# Patient Record
Sex: MALE | Race: WHITE | Employment: UNEMPLOYED
[De-identification: names, ages, dates, MRNs, and addresses within clinical notes are randomized per-mention and may not be internally consistent; named-entity substitution may affect disease eponyms.]

---

## 2020-07-05 PROBLEM — M25.511 RIGHT SHOULDER PAIN: Status: ACTIVE | Noted: 2020-07-05

## 2020-09-07 PROBLEM — I16.1 HYPERTENSIVE EMERGENCY: Status: ACTIVE | Noted: 2020-09-07

## 2020-09-07 PROBLEM — F10.10 ALCOHOL ABUSE: Status: ACTIVE | Noted: 2020-09-07

## 2020-09-07 PROBLEM — I21.4 NSTEMI (NON-ST ELEVATED MYOCARDIAL INFARCTION) (HCC): Status: ACTIVE | Noted: 2020-09-07

## 2021-03-26 PROBLEM — R07.9 CHEST PAIN: Status: ACTIVE | Noted: 2021-03-26

## 2021-03-26 PROBLEM — F10.10 ETOH ABUSE: Status: ACTIVE | Noted: 2021-03-26

## 2021-04-27 PROBLEM — E87.1 HYPONATREMIA: Status: ACTIVE | Noted: 2021-04-27

## 2021-04-27 PROBLEM — S32.009A LUMBAR FRACTURE WITH CORD INJURY (HCC): Status: ACTIVE | Noted: 2021-04-27

## 2021-04-27 PROBLEM — S34.109A LUMBAR FRACTURE WITH CORD INJURY (HCC): Status: ACTIVE | Noted: 2021-04-27

## 2021-04-27 PROBLEM — S32.019A L1 VERTEBRAL FRACTURE (HCC): Status: ACTIVE | Noted: 2021-04-27

## 2021-04-27 PROBLEM — N17.9 ACUTE RENAL FAILURE (ARF) (HCC): Status: ACTIVE | Noted: 2021-04-27

## 2021-04-27 PROBLEM — F10.20 ALCOHOL DEPENDENCE (HCC): Status: ACTIVE | Noted: 2021-04-27

## 2021-04-27 PROBLEM — E86.0 DEHYDRATION: Status: ACTIVE | Noted: 2021-04-27

## 2021-12-02 ENCOUNTER — OFFICE VISIT (OUTPATIENT)
Dept: FAMILY MEDICINE CLINIC | Facility: CLINIC | Age: 56
End: 2021-12-02

## 2021-12-02 VITALS
SYSTOLIC BLOOD PRESSURE: 170 MMHG | DIASTOLIC BLOOD PRESSURE: 100 MMHG | WEIGHT: 282 LBS | BODY MASS INDEX: 34.34 KG/M2 | OXYGEN SATURATION: 99 % | HEART RATE: 77 BPM | HEIGHT: 76 IN | TEMPERATURE: 98 F | RESPIRATION RATE: 18 BRPM

## 2021-12-02 DIAGNOSIS — F10.10 ALCOHOL ABUSE: ICD-10-CM

## 2021-12-02 DIAGNOSIS — I25.10 CORONARY ARTERY DISEASE INVOLVING NATIVE HEART WITHOUT ANGINA PECTORIS, UNSPECIFIED VESSEL OR LESION TYPE: ICD-10-CM

## 2021-12-02 DIAGNOSIS — F32.A DEPRESSION, UNSPECIFIED DEPRESSION TYPE: ICD-10-CM

## 2021-12-02 DIAGNOSIS — M10.9 GOUT, UNSPECIFIED CAUSE, UNSPECIFIED CHRONICITY, UNSPECIFIED SITE: ICD-10-CM

## 2021-12-02 DIAGNOSIS — M79.89 LEG SWELLING: Primary | ICD-10-CM

## 2021-12-02 DIAGNOSIS — I10 PRIMARY HYPERTENSION: ICD-10-CM

## 2021-12-02 PROCEDURE — 99203 OFFICE O/P NEW LOW 30 MIN: CPT | Performed by: FAMILY MEDICINE

## 2021-12-02 RX ORDER — ROSUVASTATIN CALCIUM 40 MG/1
TABLET, COATED ORAL
Qty: 30 TABLET | Refills: 2
Start: 2021-12-02 | End: 2022-02-14 | Stop reason: SDUPTHER

## 2021-12-02 RX ORDER — CARVEDILOL 25 MG/1
TABLET ORAL
Qty: 60 TABLET | Refills: 2
Start: 2021-12-02 | End: 2022-02-14 | Stop reason: SDUPTHER

## 2021-12-02 RX ORDER — LISINOPRIL AND HYDROCHLOROTHIAZIDE 12.5; 2 MG/1; MG/1
1 TABLET ORAL DAILY
Qty: 30 TABLET | Refills: 2 | Status: SHIPPED | OUTPATIENT
Start: 2021-12-02 | End: 2022-03-21 | Stop reason: SDUPTHER

## 2021-12-02 RX ORDER — FLUOXETINE HYDROCHLORIDE 40 MG/1
40 CAPSULE ORAL DAILY
Qty: 30 CAPSULE | Refills: 2
Start: 2021-12-02 | End: 2022-03-21 | Stop reason: SDUPTHER

## 2021-12-02 RX ORDER — ALLOPURINOL 300 MG/1
300 TABLET ORAL DAILY
Qty: 30 TABLET | Refills: 2
Start: 2021-12-02 | End: 2022-02-14 | Stop reason: SDUPTHER

## 2021-12-02 NOTE — PROGRESS NOTES
[place] financial assistance application given to the patient    Release of information signed by patient to get records from cardiologist Dr Jose Roldan    Discharge instructions reviewed with patient    Medication list and understanding of medications reviewed with patient. OTC and herbal medications reviewed and added to med list if applicable  Barriers to adherence assessed. Guidance given regarding new medications this visit, including reason for taking this medicine, and common side effects. AVS given to patient. Explained to patient. Patient expressed understanding.

## 2021-12-02 NOTE — PROGRESS NOTES
ELVIS Lyons is a 64 y.o. male being seen today for   Chief Complaint   Patient presents with    Medication Refill     Cholestrol, Htn, Gout medication. Pt has not taken medication in a while. .IOV for this pt to care a Hiram Elizalde.    he states that he needs medicine refilled for blood pressure and gout and cholesterol. He was most recently on prozac, allopurinol, lisinopril, crestor. He is out of all his meds. His previous physician retired. pmhx includes CAD, MI, COPD, anxiety, depression. MI was over a year ago. He states he does not have chest pain,  He does have some leg swelling. Last echo was about a year ago (result not in chart)  he was seeing Dr Jose Dhillon for cardiology. He does not think he has CHF. He can lie flat to sleep without difficulty. Does not smoke. Drinks 4-5 beers most days but has not had a drink since he checked in a [employer] misison about a week ago and he states no issues with not drinking. He has a daily tremor for almost a year. Leg swelling for a year or so. Does not know if he has any liver disease       Past Medical History:   Diagnosis Date    Depression     Emphysema lung ([facility] [address]     Gout     Hypertension     Ill-defined condition     upper and lower back pain         ROS  Patient states that he is feeling well. Denies complaints of chest pain, shortness of breath, dizziness or weakness. he denies nausea, vomiting or diarrhea. Current Outpatient Medications   Medication Sig    FLUoxetine (PROzac) 40 mg capsule Take 1 Capsule by mouth daily.  allopurinoL (ZYLOPRIM) 300 mg tablet Take 1 Tablet by mouth daily.  rosuvastatin (CRESTOR) 40 mg tablet rosuvastatin 40 mg tablet   Take 1 tablet every day by oral route.  lisinopril-hydroCHLOROthiazide (PRINZIDE, ZESTORETIC) 20-12.5 mg per tablet Take 1 Tablet by mouth daily.  carvediloL (COREG) 25 mg tablet carvedilol 25 mg tablet   Take 1 tablet twice a day by oral route.     isosorbide mononitrate ER (IMDUR) 30 mg tablet Take 1 Tab by mouth daily. Indications: prevention of anginal chest pain associated with coronary artery disease (Patient not taking: Reported on 12/2/2021)    spironolactone (ALDACTONE) 25 mg tablet Take 25 mg by mouth daily. (Patient not taking: Reported on 12/2/2021)    aclidinium bromide Serge Ivans Pressair) 400 mcg/actuation inhaler Tudorza Pressair 400 mcg/actuation breath activated   Inhale 1 puff(s) EVERY 12 HOURS by inhalation route. --he is given 2 inhaler samples w 30 puffs each (Patient not taking: Reported on 12/2/2021)     No current facility-administered medications for this visit. PE  Visit Vitals  BP (!) 170/100 (BP 1 Location: Left arm, BP Patient Position: Sitting, BP Cuff Size: Large adult long)   Pulse 77   Temp 98 °F (36.7 °C) (Temporal)   Resp 18   Ht 6' 4\" (1.93 m)   Wt 282 lb (127.9 kg)   SpO2 99%   BMI 34.33 kg/m²        Alert and oriented with normal mood and affect. he is well developed and well nourished . Lungs are clear without wheezing. Heart rate is regular without murmurs or gallops. There is 2+ lower extremity edema. Assessment and Plan:        ICD-10-CM ICD-9-CM    1. Leg swelling   May be multifactorial.  Suspect liver disease based on labs (low platelets, elevated LFT)  M79.89 729.81    2. Primary hypertension   Restart lisinopril at higher dose and add hctz for additional control I10 401.9    3. Gout, unspecified cause, unspecified chronicity, unspecified site   Refill allopurinol M10.9 274.9    4. Coronary artery disease involving native heart without angina pectoris, unspecified vessel or lesion type  I25.10 414.01    5. Depression, unspecified depression type   Restart prozac F32. A 311    6. Alcohol abuse   Chart dx of alcohol abuse. Per pt he is having no problem with not drinking at the moment. Encouraged him to continue abstinence. Will discuss more on his follow up F10.10 305.00      followup in one month for recheck and labs. Check cbc, cmp, INR, hep panel    Awilda Lamar MD

## 2022-01-06 ENCOUNTER — OFFICE VISIT (OUTPATIENT)
Dept: FAMILY MEDICINE CLINIC | Facility: CLINIC | Age: 57
End: 2022-01-06

## 2022-01-06 ENCOUNTER — HOSPITAL ENCOUNTER (OUTPATIENT)
Dept: LAB | Age: 57
Discharge: HOME | End: 2022-01-06

## 2022-01-06 VITALS
RESPIRATION RATE: 16 BRPM | BODY MASS INDEX: 34.33 KG/M2 | SYSTOLIC BLOOD PRESSURE: 120 MMHG | HEART RATE: 73 BPM | HEIGHT: 76 IN | OXYGEN SATURATION: 99 % | TEMPERATURE: 98 F | DIASTOLIC BLOOD PRESSURE: 73 MMHG

## 2022-01-06 DIAGNOSIS — F41.9 ANXIETY: ICD-10-CM

## 2022-01-06 DIAGNOSIS — D69.6 THROMBOCYTOPENIA (HCC): ICD-10-CM

## 2022-01-06 DIAGNOSIS — M79.89 LEG SWELLING: Primary | ICD-10-CM

## 2022-01-06 DIAGNOSIS — M79.89 LEG SWELLING: ICD-10-CM

## 2022-01-06 LAB
ALBUMIN SERPL-MCNC: 4 G/DL (ref 3.4–5)
ALBUMIN/GLOB SERPL: 1.3 {RATIO} (ref 0.8–1.7)
ALP SERPL-CCNC: 71 U/L (ref 45–117)
ALT SERPL-CCNC: 38 U/L (ref 16–61)
ANION GAP SERPL CALC-SCNC: 7 MMOL/L (ref 3–18)
AST SERPL-CCNC: 31 U/L (ref 10–38)
BASOPHILS # BLD: 0 K/UL (ref 0–0.1)
BASOPHILS NFR BLD: 0 % (ref 0–2)
BILIRUB SERPL-MCNC: 0.4 MG/DL (ref 0.2–1)
BUN SERPL-MCNC: 17 MG/DL (ref 7–18)
BUN/CREAT SERPL: 18 (ref 12–20)
CALCIUM SERPL-MCNC: 9 MG/DL (ref 8.5–10.1)
CHLORIDE SERPL-SCNC: 102 MMOL/L (ref 100–111)
CO2 SERPL-SCNC: 26 MMOL/L (ref 21–32)
CREAT SERPL-MCNC: 0.96 MG/DL (ref 0.6–1.3)
DIFFERENTIAL METHOD BLD: ABNORMAL
EOSINOPHIL # BLD: 0 K/UL (ref 0–0.4)
EOSINOPHIL NFR BLD: 1 % (ref 0–5)
ERYTHROCYTE [DISTWIDTH] IN BLOOD BY AUTOMATED COUNT: 14.9 % (ref 11.6–14.5)
GLOBULIN SER CALC-MCNC: 3.2 G/DL (ref 2–4)
GLUCOSE SERPL-MCNC: 103 MG/DL (ref 74–99)
HCT VFR BLD AUTO: 32.7 % (ref 36–48)
HGB BLD-MCNC: 10.1 G/DL (ref 13–16)
IMM GRANULOCYTES # BLD AUTO: 0 K/UL (ref 0–0.04)
IMM GRANULOCYTES NFR BLD AUTO: 0 % (ref 0–0.5)
INR PPP: 1 (ref 0.8–1.2)
LYMPHOCYTES # BLD: 0.5 K/UL (ref 0.9–3.6)
LYMPHOCYTES NFR BLD: 18 % (ref 21–52)
MCH RBC QN AUTO: 29.2 PG (ref 24–34)
MCHC RBC AUTO-ENTMCNC: 30.9 G/DL (ref 31–37)
MCV RBC AUTO: 94.5 FL (ref 78–100)
MONOCYTES # BLD: 0.7 K/UL (ref 0.05–1.2)
MONOCYTES NFR BLD: 25 % (ref 3–10)
NEUTS SEG # BLD: 1.6 K/UL (ref 1.8–8)
NEUTS SEG NFR BLD: 56 % (ref 40–73)
NRBC # BLD: 0 K/UL (ref 0–0.01)
NRBC BLD-RTO: 0 PER 100 WBC
PLATELET # BLD AUTO: 170 K/UL (ref 135–420)
PMV BLD AUTO: 10.3 FL (ref 9.2–11.8)
POTASSIUM SERPL-SCNC: 4.5 MMOL/L (ref 3.5–5.5)
PROT SERPL-MCNC: 7.2 G/DL (ref 6.4–8.2)
PROTHROMBIN TIME: 13.4 SEC (ref 11.5–15.2)
RBC # BLD AUTO: 3.46 M/UL (ref 4.35–5.65)
SODIUM SERPL-SCNC: 135 MMOL/L (ref 136–145)
WBC # BLD AUTO: 2.9 K/UL (ref 4.6–13.2)

## 2022-01-06 PROCEDURE — 80074 ACUTE HEPATITIS PANEL: CPT

## 2022-01-06 PROCEDURE — 85610 PROTHROMBIN TIME: CPT

## 2022-01-06 PROCEDURE — 99214 OFFICE O/P EST MOD 30 MIN: CPT | Performed by: FAMILY MEDICINE

## 2022-01-06 PROCEDURE — 80053 COMPREHEN METABOLIC PANEL: CPT

## 2022-01-06 PROCEDURE — 85025 COMPLETE CBC W/AUTO DIFF WBC: CPT

## 2022-01-06 RX ORDER — BUSPIRONE HYDROCHLORIDE 5 MG/1
5 TABLET ORAL 2 TIMES DAILY
Qty: 60 TABLET | Refills: 2
Start: 2022-01-06 | End: 2022-03-21 | Stop reason: SDUPTHER

## 2022-01-06 RX ORDER — LORAZEPAM 1 MG/1
1 TABLET ORAL
Qty: 30 TABLET | Refills: 0 | Status: SHIPPED | OUTPATIENT
Start: 2022-01-06

## 2022-01-06 RX ORDER — ARIPIPRAZOLE 5 MG/1
5 TABLET ORAL DAILY
Qty: 30 TABLET | Refills: 2
Start: 2022-01-06 | End: 2022-03-21 | Stop reason: SDUPTHER

## 2022-01-06 NOTE — PROGRESS NOTES
ELVIS Caicedo is a 64 y.o. male being seen today for   Chief Complaint   Patient presents with    Medication Refill   . he states that follow up for this pt seen a month ago with elevated bp, depression, possible cirrhosis. Started back on meds for depression, gout, htn. States he was on lorazapam bid in the past.  Dr Milly Ogden was prescribring this for him. He was taking for anxiiety, not for alcohol withdrawal but he did recently stop drinking about 5 weeks ago. He had some milld shakes when he stopped. He thinks his alcohol has been a form of self medication in the past.  He is not having any trouble with being off alcohol so far    Has a chart dx of copd but inhalers never really seemed to help. He is a non smoker. His leg swelling is a little better         Past Medical History:   Diagnosis Date    Depression     Emphysema lung ([address]     Gout     Hypertension     Ill-defined condition     upper and lower back pain         ROS  Patient states that he is feeling well. Denies complaints of chest pain, shortness of breath, dizziness or weakness. he denies nausea, vomiting or diarrhea. Current Outpatient Medications   Medication Sig    FLUoxetine (PROzac) 40 mg capsule Take 1 Capsule by mouth daily.  allopurinoL (ZYLOPRIM) 300 mg tablet Take 1 Tablet by mouth daily.  rosuvastatin (CRESTOR) 40 mg tablet rosuvastatin 40 mg tablet   Take 1 tablet every day by oral route.  lisinopril-hydroCHLOROthiazide (PRINZIDE, ZESTORETIC) 20-12.5 mg per tablet Take 1 Tablet by mouth daily.  carvediloL (COREG) 25 mg tablet carvedilol 25 mg tablet   Take 1 tablet twice a day by oral route.  isosorbide mononitrate ER (IMDUR) 30 mg tablet Take 1 Tab by mouth daily. Indications: prevention of anginal chest pain associated with coronary artery disease (Patient not taking: Reported on 12/2/2021)    spironolactone (ALDACTONE) 25 mg tablet Take 25 mg by mouth daily.  (Patient not taking: Reported on 12/2/2021)    aclidinium bromide Virgilio Delgado Pressair) 400 mcg/actuation inhaler Tudorza Pressair 400 mcg/actuation breath activated   Inhale 1 puff(s) EVERY 12 HOURS by inhalation route. --he is given 2 inhaler samples w 30 puffs each (Patient not taking: Reported on 12/2/2021)     No current facility-administered medications for this visit. PE  Visit Vitals  /73 (BP 1 Location: Left upper arm, BP Patient Position: Sitting, BP Cuff Size: Adult)   Pulse 73   Temp 98 °F (36.7 °C) (Temporal)   Resp 16   Ht 6' 4\" (1.93 m)   SpO2 99%   BMI 34.33 kg/m²        Alert and oriented with normal mood and affect. he is well developed and well nourished . Lungs are clear without wheezing. Heart rate is regular without murmurs or gallops. There is trace lower extremity edema. Assessment and Plan:        ICD-10-CM ICD-9-CM    1. Leg swelling  M79.89 729.81 HEPATITIS PANEL, ACUTE      CBC WITH AUTOMATED DIFF      METABOLIC PANEL, COMPREHENSIVE      PROTHROMBIN TIME + INR   2. Thrombocytopenia (HCC)  D69.6 287.5 HEPATITIS PANEL, ACUTE      CBC WITH AUTOMATED DIFF      METABOLIC PANEL, COMPREHENSIVE      PROTHROMBIN TIME + INR     bp and leg swelling both improved  Sutter buspar for anxiety  rx abilify  Short term rx for ativan.  Use sparingly  Labs today  Follow up in one month or so      Cyndie Keita MD

## 2022-01-06 NOTE — PROGRESS NOTES
Patient presents for lab draw ordered by:    Ordering Provider:  Angel Pino  Ordering Department/Practice:  [facility]  Phone: [phone]  Date Ordered:  1/6/22    The following labs were drawn and sent to  [address] by Saba Lamb LPN:    CBC, CMP and Hepatitis panel PT INR    The following tubes were sent:    Gold  ( 2), Lavender  ( 1) and Blue  ( 1)    Draw site left  brachial.  Patient tolerated draw with no distress. Discharge instructions reviewed with patient    Medication list and understanding of medications reviewed with patient. OTC and herbal medications reviewed and added to med list if applicable  Barriers to adherence assessed. Guidance given regarding new medications this visit, including reason for taking this medicine, and common side effects. AVS given to patient. Explained to patient. Patient expressed understanding.

## 2022-01-07 LAB
HAV IGM SER QL: NEGATIVE
HBV CORE IGM SER QL: NEGATIVE
HBV SURFACE AG SER QL: <0.1 INDEX
HBV SURFACE AG SER QL: NEGATIVE
HCV AB SER IA-ACNC: 0.02 INDEX
HCV AB SERPL QL IA: NEGATIVE
HCV COMMENT,HCGAC: NORMAL
SP1: NORMAL
SP2: NORMAL
SP3: NORMAL

## 2022-01-26 ENCOUNTER — TELEPHONE (OUTPATIENT)
Dept: FAMILY MEDICINE CLINIC | Facility: CLINIC | Age: 57
End: 2022-01-26

## 2022-01-26 ENCOUNTER — VIRTUAL VISIT (OUTPATIENT)
Dept: FAMILY MEDICINE CLINIC | Facility: CLINIC | Age: 57
End: 2022-01-26

## 2022-01-26 DIAGNOSIS — H57.89 RED EYE: Primary | ICD-10-CM

## 2022-01-26 PROCEDURE — 99422 OL DIG E/M SVC 11-20 MIN: CPT | Performed by: CLINICAL NURSE SPECIALIST

## 2022-01-26 RX ORDER — POLYMYXIN B SULFATE AND TRIMETHOPRIM 1; 10000 MG/ML; [USP'U]/ML
1 SOLUTION OPHTHALMIC EVERY 6 HOURS
Qty: 10 ML | Refills: 0 | Status: SHIPPED | OUTPATIENT
Start: 2022-01-26 | End: 2022-02-02

## 2022-01-26 NOTE — PROGRESS NOTES
Leena Perry (: 1965) is a 62 y.o. male, established patient, here for evaluation of the following chief complaint(s):    Red and irritated left eye     ASSESSMENT/PLAN:  Below is the assessment and plan developed based on review of pertinent history, labs, studies, and medications. 1. Red eye    Uncertain what to make of symptoms. With unilateral presentation, most likely viral etiology. Advised in conservative interventions. Encouraged warm compresses. ABX sent in, but encouraged to hold RX x2 days. Nurse Joanne Rucker given a clinical update and discussed plan. Advised that any worsening pain or visual changes warrant an ER visit. Encouraged to call with any questions/concerns. No follow-ups on file. SUBJECTIVE/OBJECTIVE:     Patient states that his left eye is very irritated and red, started yesterday. Has not had any trauma, wears contact lenses. Has been wearing his glasses  since the onset of the irritation. Not painful, slightly itching, but mostly irritated. Has minimal drainage, no visual changes. Denies fever, chills cough, congestion, sore throat. Nurse Joanne Rucker at the [facility] advised that he could not go to urgent care due to finances so the ER or care gelacio Brandon Getting were the only options. Would really like to be evaluated for the care a Roma Getting first.           On this date 2022 I have spent 30 minutes reviewing previous notes, test results and face to face (virtual) with the patient discussing the diagnosis and importance of compliance with the treatment plan as well as documenting on the day of the visit. Leena Perry, was evaluated through a synchronous (real-time) audio-video encounter. The patient (or guardian if applicable) is aware that this is a billable service, which includes applicable co-pays. Verbal consent to proceed has been obtained.  The visit was conducted pursuant to the emergency declaration under the [facility], [facility] waiver authority and the [employer] Act. Patient identification was verified, and a caregiver was present when appropriate. The patient was located at home in a state where the provider was licensed to provide care. An electronic signature was used to authenticate this note.   -- Marvin Freeman NP

## 2022-01-27 NOTE — PROGRESS NOTES
I have reviewed the attatched progress note and I agree with the plan and medical decision making as outlined by Juanita Sorto MD

## 2022-02-15 RX ORDER — CARVEDILOL 25 MG/1
TABLET ORAL
Qty: 60 TABLET | Refills: 2 | Status: SHIPPED | OUTPATIENT
Start: 2022-02-15 | End: 2022-02-15 | Stop reason: SDUPTHER

## 2022-02-15 RX ORDER — ALLOPURINOL 300 MG/1
300 TABLET ORAL DAILY
Qty: 30 TABLET | Refills: 2 | Status: SHIPPED | OUTPATIENT
Start: 2022-02-15 | End: 2022-02-15 | Stop reason: SDUPTHER

## 2022-02-15 RX ORDER — ROSUVASTATIN CALCIUM 40 MG/1
TABLET, COATED ORAL
Qty: 30 TABLET | Refills: 2 | Status: SHIPPED | OUTPATIENT
Start: 2022-02-15 | End: 2022-03-21 | Stop reason: SDUPTHER

## 2022-02-15 RX ORDER — CARVEDILOL 25 MG/1
TABLET ORAL
Qty: 60 TABLET | Refills: 2 | Status: SHIPPED | OUTPATIENT
Start: 2022-02-15 | End: 2022-03-21 | Stop reason: SDUPTHER

## 2022-02-15 RX ORDER — ROSUVASTATIN CALCIUM 40 MG/1
TABLET, COATED ORAL
Qty: 30 TABLET | Refills: 2 | Status: SHIPPED | OUTPATIENT
Start: 2022-02-15 | End: 2022-02-15 | Stop reason: SDUPTHER

## 2022-02-15 RX ORDER — ALLOPURINOL 300 MG/1
300 TABLET ORAL DAILY
Qty: 30 TABLET | Refills: 2 | Status: SHIPPED | OUTPATIENT
Start: 2022-02-15 | End: 2022-03-21 | Stop reason: SDUPTHER

## 2022-03-04 ENCOUNTER — TELEPHONE (OUTPATIENT)
Dept: FAMILY MEDICINE CLINIC | Facility: CLINIC | Age: 57
End: 2022-03-04

## 2022-03-04 NOTE — TELEPHONE ENCOUNTER
Patient overdue for follow up for January appt. Patient contacted and appt scheduled.     PCP/Specialist follow up:   Future Appointments   Date Time Provider Ming Herrera   3/15/2022  1:00 PM Lexus Tate NP [facility] BS AMB

## 2022-03-11 ENCOUNTER — TELEPHONE (OUTPATIENT)
Dept: FAMILY MEDICINE CLINIC | Facility: CLINIC | Age: 57
End: 2022-03-11

## 2022-03-21 ENCOUNTER — VIRTUAL VISIT (OUTPATIENT)
Dept: FAMILY MEDICINE CLINIC | Facility: CLINIC | Age: 57
End: 2022-03-21

## 2022-03-21 DIAGNOSIS — D64.9 ANEMIA, UNSPECIFIED TYPE: Primary | ICD-10-CM

## 2022-03-21 DIAGNOSIS — I10 PRIMARY HYPERTENSION: ICD-10-CM

## 2022-03-21 DIAGNOSIS — I25.10 CORONARY ARTERY DISEASE INVOLVING NATIVE HEART WITHOUT ANGINA PECTORIS, UNSPECIFIED VESSEL OR LESION TYPE: ICD-10-CM

## 2022-03-21 DIAGNOSIS — M10.9 GOUT, UNSPECIFIED CAUSE, UNSPECIFIED CHRONICITY, UNSPECIFIED SITE: ICD-10-CM

## 2022-03-21 DIAGNOSIS — D72.829 LEUKOCYTOSIS, UNSPECIFIED TYPE: ICD-10-CM

## 2022-03-21 DIAGNOSIS — F32.A DEPRESSION, UNSPECIFIED DEPRESSION TYPE: ICD-10-CM

## 2022-03-21 PROCEDURE — 99442 PR PHYS/QHP TELEPHONE EVALUATION 11-20 MIN: CPT | Performed by: FAMILY MEDICINE

## 2022-03-21 RX ORDER — BUSPIRONE HYDROCHLORIDE 5 MG/1
5 TABLET ORAL 2 TIMES DAILY
Qty: 60 TABLET | Refills: 2 | Status: SHIPPED | OUTPATIENT
Start: 2022-03-21

## 2022-03-21 RX ORDER — ARIPIPRAZOLE 5 MG/1
5 TABLET ORAL DAILY
Qty: 30 TABLET | Refills: 2 | Status: SHIPPED | OUTPATIENT
Start: 2022-03-21

## 2022-03-21 RX ORDER — ROSUVASTATIN CALCIUM 40 MG/1
TABLET, COATED ORAL
Qty: 30 TABLET | Refills: 2 | Status: SHIPPED | OUTPATIENT
Start: 2022-03-21

## 2022-03-21 RX ORDER — CARVEDILOL 25 MG/1
TABLET ORAL
Qty: 60 TABLET | Refills: 2 | Status: SHIPPED | OUTPATIENT
Start: 2022-03-21

## 2022-03-21 RX ORDER — LISINOPRIL AND HYDROCHLOROTHIAZIDE 12.5; 2 MG/1; MG/1
1 TABLET ORAL DAILY
Qty: 30 TABLET | Refills: 2 | Status: SHIPPED | OUTPATIENT
Start: 2022-03-21

## 2022-03-21 RX ORDER — ALLOPURINOL 300 MG/1
300 TABLET ORAL DAILY
Qty: 30 TABLET | Refills: 2 | Status: SHIPPED | OUTPATIENT
Start: 2022-03-21

## 2022-03-21 RX ORDER — FLUOXETINE HYDROCHLORIDE 40 MG/1
40 CAPSULE ORAL DAILY
Qty: 30 CAPSULE | Refills: 2 | Status: SHIPPED | OUTPATIENT
Start: 2022-03-21

## 2022-03-21 NOTE — PROGRESS NOTES
ELVIS Nolan is a 62 y.o. male being seen today for No chief complaint on file. .  he states that about a week ago he was walking and felt a pop in his knee. Was painful and swelled up to 3x its normal size. He went to see [facility]. AT [facility] [address] Addie Barker [phone]  They referred him to go get an MRI which he is getting this Thursday. Maybe also seeing a specialist?     [facility]. His health care visit was arranged by the [facility] and he thinks it was free. He is not clear whether or not they are primary care or if he wants to switch. He needs refills on his bp and mental health meds. Also did not receive his lab letter. On chart review his labs did show low hct and low wbc present since last year. His platelets were previously low and now wnl. His eye redness resolved with drops prescribed last visit. Telemedicine viist due to covid precautions. Past Medical History:   Diagnosis Date    Depression     Emphysema lung ([address]     Gout     Hypertension     Ill-defined condition     upper and lower back pain         ROS  Patient states that he is feeling well. Denies complaints of chest pain, shortness of breath, swelling of legs, dizziness or weakness. he denies nausea, vomiting or diarrhea. Current Outpatient Medications   Medication Sig    rosuvastatin (CRESTOR) 40 mg tablet Take 1 tablet every day by oral route.  carvediloL (COREG) 25 mg tablet Take 1 tablet twice a day by oral route.  allopurinoL (ZYLOPRIM) 300 mg tablet Take 1 Tablet by mouth daily.  busPIRone (BUSPAR) 5 mg tablet Take 1 Tablet by mouth two (2) times a day.  ARIPiprazole (Abilify) 5 mg tablet Take 1 Tablet by mouth daily.  FLUoxetine (PROzac) 40 mg capsule Take 1 Capsule by mouth daily.  lisinopril-hydroCHLOROthiazide (PRINZIDE, ZESTORETIC) 20-12.5 mg per tablet Take 1 Tablet by mouth daily.     LORazepam (ATIVAN) 1 mg tablet Take 1 Tablet by mouth two (2) times daily as needed for Anxiety. Max Daily Amount: 2 mg.  isosorbide mononitrate ER (IMDUR) 30 mg tablet Take 1 Tab by mouth daily. Indications: prevention of anginal chest pain associated with coronary artery disease (Patient not taking: Reported on 12/2/2021)    spironolactone (ALDACTONE) 25 mg tablet Take 25 mg by mouth daily. (Patient not taking: Reported on 12/2/2021)    aclidinium bromide Indira Cruise Pressair) 400 mcg/actuation inhaler Tudorza Pressair 400 mcg/actuation breath activated   Inhale 1 puff(s) EVERY 12 HOURS by inhalation route. --he is given 2 inhaler samples w 30 puffs each (Patient not taking: Reported on 12/2/2021)     No current facility-administered medications for this visit. PE  There were no vitals taken for this visit. Alert and oriented with normal mood and affect. Assessment and Plan:        ICD-10-CM ICD-9-CM    1. Anemia, unspecified type  D64.9 285.9 LIPID PANEL   Unclear cause for anemia and leukocytosis. Will recheck cbc and also iron profile, b 12, folate, HIV. Follow up by phone after labs   CBC WITH AUTOMATED DIFF      HIV 1/2 AG/AB, 4TH GENERATION,W RFLX CONFIRM      IRON PROFILE      VITAMIN B12 & FOLATE   2. Leukocytosis, unspecified type  D72.829 288.60 LIPID PANEL      CBC WITH AUTOMATED DIFF      HIV 1/2 AG/AB, 4TH GENERATION,W RFLX CONFIRM      IRON PROFILE      VITAMIN B12 & FOLATE   3. Primary hypertension  I10 401.9    4. Gout, unspecified cause, unspecified chronicity, unspecified site  M10.9 274.9    5. Depression, unspecified depression type  F32. A 311    6. Coronary artery disease involving native heart without angina pectoris, unspecified vessel or lesion type  I25.10 414.01      meds refilled  Labs ordered  He will follow up with his MRI this week and possible orthopedic viist?     We will try to get his notes from his recent visits.   Advised pt that he can switch to new PCP if this seems like a more convenient clinic for him or continue with care gelacio Gonzalez MD      Telephone call 15 minutes    Pursuant to the emergency declaration under the [facility], [facility] waiver authority and the [employer] Act, this Virtual  Visit was conducted, with patient's consent, to reduce the patient's risk of exposure to COVID-19 and provide continuity of care for an established patient.

## 2022-03-23 ENCOUNTER — TELEPHONE (OUTPATIENT)
Dept: FAMILY MEDICINE CLINIC | Facility: CLINIC | Age: 57
End: 2022-03-23

## 2022-03-31 ENCOUNTER — TELEPHONE (OUTPATIENT)
Dept: FAMILY MEDICINE CLINIC | Facility: CLINIC | Age: 57
End: 2022-03-31

## 2022-04-05 ENCOUNTER — TELEPHONE (OUTPATIENT)
Dept: FAMILY MEDICINE CLINIC | Facility: CLINIC | Age: 57
End: 2022-04-05

## 2022-04-05 NOTE — TELEPHONE ENCOUNTER
Can you call this nice pt and ask him to go to THE [facility] and have his labs done   Thank you!

## 2022-04-06 NOTE — TELEPHONE ENCOUNTER
Attempted to reach out to patient to inform him to go to THE [facility] to complete labs. Left patient a voicemail stating to give the office a call back.

## 2022-04-20 NOTE — TELEPHONE ENCOUNTER
2nd attempt to reach out to patient to inform him to go to [facility] to complete labst. Left patient a voicemail to give the office a call back.

## 2022-05-09 ENCOUNTER — TELEPHONE (OUTPATIENT)
Dept: FAMILY MEDICINE CLINIC | Facility: CLINIC | Age: 57
End: 2022-05-09

## 2022-09-16 ENCOUNTER — HOSPITAL ENCOUNTER (EMERGENCY)
Age: 57
Discharge: HOME | End: 2022-09-16
Attending: EMERGENCY MEDICINE
Payer: MEDICAID

## 2022-09-16 VITALS
HEART RATE: 63 BPM | DIASTOLIC BLOOD PRESSURE: 66 MMHG | RESPIRATION RATE: 18 BRPM | TEMPERATURE: 98 F | SYSTOLIC BLOOD PRESSURE: 109 MMHG | OXYGEN SATURATION: 99 %

## 2022-09-16 DIAGNOSIS — Z76.0 MEDICATION REFILL: Primary | ICD-10-CM

## 2022-09-16 PROCEDURE — 99283 EMERGENCY DEPT VISIT LOW MDM: CPT

## 2022-09-16 RX ORDER — HYDROXYZINE PAMOATE 50 MG/1
50 CAPSULE ORAL 2 TIMES DAILY
Qty: 40 CAPSULE | Refills: 0 | Status: SHIPPED | OUTPATIENT
Start: 2022-09-16 | End: 2022-10-06

## 2022-09-16 NOTE — ED PROVIDER NOTES
EMERGENCY DEPARTMENT HISTORY AND PHYSICAL EXAM    Date: 9/16/2022  Patient Name: Laury Langford    History of Presenting Illness     Chief Complaint   Patient presents with    Medication Refill         History Provided By: Patient    Chief Complaint: Medication refill  Duration: N/A  Timing: N/A  Location: N/A  Quality: Almost out  Severity: Moderate  Modifying Factors: Worse after recently moving to this area. Associated Symptoms: none       Additional History (Context): Laury Langford is a 62 y.o. male with a history of depression and hypertension who presents today for issues listed above. Patient states that he is new to the area and has a new patient appointment on September 29 however is here because he will run out of his trazodone and Vistaril before then. Patient states he feels otherwise well and has no complaints. PCP: Maggie Mejia MD    Current Outpatient Medications   Medication Sig Dispense Refill    hydrOXYzine pamoate (VistariL) 50 mg capsule Take 1 Capsule by mouth two (2) times a day for 20 days. 40 Capsule 0    rosuvastatin (CRESTOR) 40 mg tablet Take 1 tablet every day by oral route. 30 Tablet 2    carvediloL (COREG) 25 mg tablet Take 1 tablet twice a day by oral route. 60 Tablet 2    allopurinoL (ZYLOPRIM) 300 mg tablet Take 1 Tablet by mouth daily. 30 Tablet 2    busPIRone (BUSPAR) 5 mg tablet Take 1 Tablet by mouth two (2) times a day. 60 Tablet 2    ARIPiprazole (Abilify) 5 mg tablet Take 1 Tablet by mouth daily. 30 Tablet 2    FLUoxetine (PROzac) 40 mg capsule Take 1 Capsule by mouth daily. 30 Capsule 2    lisinopril-hydroCHLOROthiazide (PRINZIDE, ZESTORETIC) 20-12.5 mg per tablet Take 1 Tablet by mouth daily. 30 Tablet 2    LORazepam (ATIVAN) 1 mg tablet Take 1 Tablet by mouth two (2) times daily as needed for Anxiety. Max Daily Amount: 2 mg. 30 Tablet 0    isosorbide mononitrate ER (IMDUR) 30 mg tablet Take 1 Tab by mouth daily.  Indications: prevention of anginal chest pain associated with coronary artery disease (Patient not taking: Reported on 12/2/2021) 30 Tab 2    spironolactone (ALDACTONE) 25 mg tablet Take 25 mg by mouth daily. (Patient not taking: Reported on 12/2/2021)      aclidinium bromide [facility] Pressair) 400 mcg/actuation inhaler Tudorza Pressair 400 mcg/actuation breath activated   Inhale 1 puff(s) EVERY 12 HOURS by inhalation route. --he is given 2 inhaler samples w 30 puffs each (Patient not taking: Reported on 12/2/2021)         Past History     Past Medical History:  Past Medical History:   Diagnosis Date    Depression     Emphysema lung ([address]     Gout     Hypertension     Ill-defined condition     upper and lower back pain       Past Surgical History:  No past surgical history on file. Family History:  Family History   Problem Relation Age of Onset    No Known Problems Mother     COPD Father        Social History:  Social History     Tobacco Use    Smoking status: Never    Smokeless tobacco: Never   Vaping Use    Vaping Use: Never used   Substance Use Topics    Alcohol use: Yes     Alcohol/week: 8.0 standard drinks     Types: 8 Cans of beer per week     Comment: Social    Drug use: Never       Allergies:  No Known Allergies      Review of Systems   Review of Systems   Constitutional:  Negative for chills and fever. HENT:  Negative for congestion, rhinorrhea and sore throat. Respiratory:  Negative for cough and shortness of breath. Cardiovascular:  Negative for chest pain. Gastrointestinal:  Negative for abdominal pain, blood in stool, constipation, diarrhea, nausea and vomiting. Genitourinary:  Negative for dysuria, frequency and hematuria. Musculoskeletal:  Negative for back pain and myalgias. Skin:  Negative for rash and wound. Neurological:  Negative for dizziness and headaches. All other systems reviewed and are negative.   All Other Systems Negative  Physical Exam     Vitals:    09/16/22 1245   BP: 109/66   Pulse: 63   Resp: 18   Temp: 98 °F (36.7 °C)   SpO2: 99%     Physical Exam  Vitals and nursing note reviewed. Constitutional:       General: He is not in acute distress. Appearance: He is well-developed. He is not diaphoretic. HENT:      Head: Normocephalic and atraumatic. Eyes:      Conjunctiva/sclera: Conjunctivae normal.   Cardiovascular:      Rate and Rhythm: Normal rate and regular rhythm. Heart sounds: Normal heart sounds. Pulmonary:      Effort: Pulmonary effort is normal. No respiratory distress. Breath sounds: Normal breath sounds. Chest:      Chest wall: No tenderness. Abdominal:      General: Bowel sounds are normal. There is no distension. Palpations: Abdomen is soft. Tenderness: There is no abdominal tenderness. There is no guarding or rebound. Musculoskeletal:         General: No deformity. Normal range of motion. Cervical back: Normal range of motion and neck supple. Skin:     General: Skin is warm and dry. Neurological:      Mental Status: He is alert and oriented to person, place, and time. Diagnostic Study Results     Labs -   No results found for this or any previous visit (from the past 12 hour(s)). Radiologic Studies -   No orders to display     CT Results  (Last 48 hours)      None          CXR Results  (Last 48 hours)      None              Medical Decision Making   I am the first provider for this patient. I reviewed the vital signs, available nursing notes, past medical history, past surgical history, family history and social history. Vital Signs-Reviewed the patient's vital signs. Records Reviewed: Nursing Notes and Old Medical Records     Procedures: None   Procedures    Provider Notes (Medical Decision Making):       Differential: Medication refill, bipolar disorder, schizophrenia, depression, anxiety    Plan: Have agreed to refill patient's Vistaril but have stressed that patient will have to have his trazodone refilled on the 29th.   Patient states understanding and agrees. Will discharge home. MED RECONCILIATION:  No current facility-administered medications for this encounter. Current Outpatient Medications   Medication Sig    hydrOXYzine pamoate (VistariL) 50 mg capsule Take 1 Capsule by mouth two (2) times a day for 20 days. rosuvastatin (CRESTOR) 40 mg tablet Take 1 tablet every day by oral route. carvediloL (COREG) 25 mg tablet Take 1 tablet twice a day by oral route. allopurinoL (ZYLOPRIM) 300 mg tablet Take 1 Tablet by mouth daily. busPIRone (BUSPAR) 5 mg tablet Take 1 Tablet by mouth two (2) times a day. ARIPiprazole (Abilify) 5 mg tablet Take 1 Tablet by mouth daily. FLUoxetine (PROzac) 40 mg capsule Take 1 Capsule by mouth daily. lisinopril-hydroCHLOROthiazide (PRINZIDE, ZESTORETIC) 20-12.5 mg per tablet Take 1 Tablet by mouth daily. LORazepam (ATIVAN) 1 mg tablet Take 1 Tablet by mouth two (2) times daily as needed for Anxiety. Max Daily Amount: 2 mg.    isosorbide mononitrate ER (IMDUR) 30 mg tablet Take 1 Tab by mouth daily. Indications: prevention of anginal chest pain associated with coronary artery disease (Patient not taking: Reported on 12/2/2021)    spironolactone (ALDACTONE) 25 mg tablet Take 25 mg by mouth daily. (Patient not taking: Reported on 12/2/2021)    aclidinium bromide [facility] M[facility] Pressair) 400 mcg/actuation inhaler Tudorza Pressair 400 mcg/actuation breath activated   Inhale 1 puff(s) EVERY 12 HOURS by inhalation route. --he is given 2 inhaler samples w 30 puffs each (Patient not taking: Reported on 12/2/2021)       Disposition:  Home     DISCHARGE NOTE:   Pt has been reexamined. Patient has no new complaints, changes, or physical findings. Care plan outlined and precautions discussed. Results of workup were reviewed with the patient. All medications were reviewed with the patient. All of pt's questions and concerns were addressed.  Patient was instructed and agrees to follow up with PCP as well as to return to the ED upon further deterioration. Patient is ready to go home. Follow-up Information       Follow up With Specialties Details Why Contact Info    [facility] EMERGENCY DEPT Emergency Medicine  As needed [address] [place]  [phone]    Keep your apt as planned for 9/29                Current Discharge Medication List        START taking these medications    Details   hydrOXYzine pamoate (VistariL) 50 mg capsule Take 1 Capsule by mouth two (2) times a day for 20 days. Qty: 40 Capsule, Refills: 0  Start date: 9/16/2022, End date: 10/6/2022                 Diagnosis     Clinical Impression:   1. Medication refill          \"Please note that this dictation was completed with [employer], the computer voice recognition software. Quite often unanticipated grammatical, syntax, homophones, and other interpretive errors are inadvertently transcribed by the computer software. Please disregard these errors. Please excuse any errors that have escaped final proofreading. \"

## 2024-04-09 NOTE — TELEPHONE ENCOUNTER
General Surgery Week 1 Survey      Flowsheet Row Responses   [facility] facility patient discharged from? [facility]   Does the patient have one of the following disease processes/diagnoses(primary or secondary)? General Surgery   Week 1 attempt successful? No   Unsuccessful attempts Attempt 2   Revoke Readmitted            Kendra H - Registered Nurse   Left message to return call need to reschedule appointment from nurse practitioner to in person visit or telemedicine visit with Dr J Carlos Carpenter

## 2025-06-30 NOTE — ADDENDUM NOTE
ASHLIE De Los Santos to call back    Addended by: Yessi Diop on: 2/15/2022 11:59 AM     Modules accepted: Hair